# Patient Record
Sex: FEMALE | Race: WHITE | NOT HISPANIC OR LATINO | Employment: UNEMPLOYED | URBAN - METROPOLITAN AREA
[De-identification: names, ages, dates, MRNs, and addresses within clinical notes are randomized per-mention and may not be internally consistent; named-entity substitution may affect disease eponyms.]

---

## 2020-12-17 RX ORDER — LEVOTHYROXINE AND LIOTHYRONINE 38; 9 UG/1; UG/1
TABLET ORAL
Qty: 90 TABLET | Refills: 0 | Status: SHIPPED | OUTPATIENT
Start: 2020-12-17 | End: 2023-02-09 | Stop reason: SDUPTHER

## 2020-12-17 NOTE — TELEPHONE ENCOUNTER
Patient called, she needs a refill for her medication. However, she was taking ThyroidNP first, switched to armor, and wants to be switched back to ThyroidNP if possible.

## 2021-03-22 RX ORDER — LEVOTHYROXINE AND LIOTHYRONINE 38; 9 UG/1; UG/1
TABLET ORAL
Qty: 90 TABLET | Refills: 0 | OUTPATIENT
Start: 2021-03-22

## 2023-02-08 ENCOUNTER — OFFICE VISIT (OUTPATIENT)
Dept: ENDOCRINOLOGY | Facility: CLINIC | Age: 33
End: 2023-02-08

## 2023-02-08 VITALS
HEIGHT: 61 IN | BODY MASS INDEX: 29.45 KG/M2 | HEART RATE: 72 BPM | SYSTOLIC BLOOD PRESSURE: 92 MMHG | DIASTOLIC BLOOD PRESSURE: 50 MMHG | WEIGHT: 156 LBS | OXYGEN SATURATION: 99 %

## 2023-02-08 DIAGNOSIS — E55.9 VITAMIN D DEFICIENCY: ICD-10-CM

## 2023-02-08 DIAGNOSIS — E89.0 POSTOPERATIVE HYPOTHYROIDISM: Primary | ICD-10-CM

## 2023-02-08 DIAGNOSIS — R06.09 DYSPNEA ON EXERTION: ICD-10-CM

## 2023-02-08 PROCEDURE — 82306 VITAMIN D 25 HYDROXY: CPT | Performed by: INTERNAL MEDICINE

## 2023-02-08 PROCEDURE — 99214 OFFICE O/P EST MOD 30 MIN: CPT | Performed by: INTERNAL MEDICINE

## 2023-02-08 PROCEDURE — 84443 ASSAY THYROID STIM HORMONE: CPT | Performed by: INTERNAL MEDICINE

## 2023-02-08 PROCEDURE — 80053 COMPREHEN METABOLIC PANEL: CPT | Performed by: INTERNAL MEDICINE

## 2023-02-08 RX ORDER — MELATONIN
1000 DAILY
COMMUNITY

## 2023-02-08 RX ORDER — CETIRIZINE HYDROCHLORIDE 5 MG/1
5 TABLET ORAL DAILY
COMMUNITY

## 2023-02-08 RX ORDER — MAGNESIUM GLUCONATE 27 MG(500)
27 TABLET ORAL 2 TIMES DAILY
COMMUNITY

## 2023-02-08 NOTE — PROGRESS NOTES
"     Office Note      Date: 2023  Patient Name: Agueda Batres  MRN: [id number]  : 1990    Chief Complaint   Patient presents with   • Thyroid Problem   • Hashimoto's Thyroiditis       History of Present Illness:   Agueda Batres is a 32 y.o. female who presents for Thyroid Problem and Hashimoto's Thyroiditis  .  I last saw on 2020 so it has been almost 3 years.(she had moved to [place] and is now back in [place])   she has hypothyroidism following left hemithyroidectomy  And a cyst in the inferior right lobe     Current rx: no thyroid 60 mg per day     Changes in history: none   Questions /problems: she notes lightheadedness, dizziness, joint point,     Subjective          Review of Systems:   Review of Systems   Constitutional: Positive for fatigue. Negative for unexpected weight change.   HENT: Positive for voice change. Negative for trouble swallowing.    Eyes: Negative for visual disturbance.   Respiratory: Positive for choking.    Cardiovascular: Positive for palpitations.   Endocrine: Positive for heat intolerance.   Musculoskeletal: Positive for arthralgias.   Neurological: Positive for dizziness, tremors and light-headedness.   Psychiatric/Behavioral: The patient is nervous/anxious.        The following portions of the patient's history were reviewed and updated as appropriate: allergies, current medications, past family history, past medical history, past social history, past surgical history and problem list.    Objective     Visit Vitals  BP 92/50 (BP Location: Left arm, Patient Position: Sitting, Cuff Size: Adult)   Pulse 72   Ht 154.9 cm (61\")   Wt 70.8 kg (156 lb)   SpO2 99%   BMI 29.48 kg/m²           Physical Exam:  Physical Exam  Vitals reviewed.   Constitutional:       Appearance: Normal appearance.   HENT:      Head: Normocephalic and atraumatic.   Eyes:      Extraocular Movements: Extraocular movements intact.   Neck:      Comments: Left lobe absent . Right feels normal  Cardiovascular: "      Rate and Rhythm: Normal rate.   Pulmonary:      Effort: Pulmonary effort is normal. No respiratory distress.      Breath sounds: No stridor. No wheezing, rhonchi or rales.   Lymphadenopathy:      Cervical: No cervical adenopathy.   Neurological:      Mental Status: She is alert.   Psychiatric:         Mood and Affect: Mood normal.         Behavior: Behavior normal.         Thought Content: Thought content normal.         Judgment: Judgment normal.         Assessment / Plan      Assessment & Plan:  Problem List Items Addressed This Visit        Other    Postoperative hypothyroidism - Primary    Current Assessment & Plan     Stable  Clinically euthyroid. Thyroid levels ordered. Medication to be adjusted accordingly.         Relevant Medications    Thyroid (NP THYROID) 60 MG PO tablet    Other Relevant Orders    TSH    Dyspnea on exertion    Current Assessment & Plan     Lungs are clear and heart sounds good will check cbc         Relevant Orders    CBC (No Diff)    Vitamin D deficiency    Relevant Orders    Comprehensive Metabolic Panel    Vitamin D,25-Hydroxy        Winston Weiss MD   02/08/2023

## 2023-02-09 LAB
25(OH)D3 SERPL-MCNC: 40.1 NG/ML (ref 30–100)
ALBUMIN SERPL-MCNC: 4.8 G/DL (ref 3.5–5.2)
ALBUMIN/GLOB SERPL: 1.9 G/DL
ALP SERPL-CCNC: 67 U/L (ref 39–117)
ALT SERPL W P-5'-P-CCNC: 21 U/L (ref 1–33)
ANION GAP SERPL CALCULATED.3IONS-SCNC: 10.3 MMOL/L (ref 5–15)
AST SERPL-CCNC: 19 U/L (ref 1–32)
BILIRUB SERPL-MCNC: 0.7 MG/DL (ref 0–1.2)
BUN SERPL-MCNC: 10 MG/DL (ref 6–20)
BUN/CREAT SERPL: 13.5 (ref 7–25)
CALCIUM SPEC-SCNC: 10 MG/DL (ref 8.6–10.5)
CHLORIDE SERPL-SCNC: 104 MMOL/L (ref 98–107)
CO2 SERPL-SCNC: 25.7 MMOL/L (ref 22–29)
CREAT SERPL-MCNC: 0.74 MG/DL (ref 0.57–1)
EGFRCR SERPLBLD CKD-EPI 2021: 111.1 ML/MIN/1.73
GLOBULIN UR ELPH-MCNC: 2.5 GM/DL
GLUCOSE SERPL-MCNC: 96 MG/DL (ref 65–99)
POTASSIUM SERPL-SCNC: 4.4 MMOL/L (ref 3.5–5.2)
PROT SERPL-MCNC: 7.3 G/DL (ref 6–8.5)
SODIUM SERPL-SCNC: 140 MMOL/L (ref 136–145)
TSH SERPL DL<=0.05 MIU/L-ACNC: 0.52 UIU/ML (ref 0.27–4.2)

## 2023-02-09 RX ORDER — LEVOTHYROXINE AND LIOTHYRONINE 38; 9 UG/1; UG/1
TABLET ORAL
Qty: 90 TABLET | Refills: 3 | Status: SHIPPED | OUTPATIENT
Start: 2023-02-09

## 2024-02-06 RX ORDER — THYROID 60 MG/1
TABLET ORAL
Qty: 90 TABLET | Refills: 3 | Status: SHIPPED | OUTPATIENT
Start: 2024-02-06

## 2024-02-06 NOTE — TELEPHONE ENCOUNTER
Rx Refill Note  Requested Prescriptions     Pending Prescriptions Disp Refills    Thyroid (ARMOUR THYROID) 60 MG PO tablet [Pharmacy Med Name: ARMOUR THYROID 60 MG TABLET] 90 tablet 3     Sig: TAKE 1 TABLET BY MOUTH DAILY          Last office visit with prescribing clinician: 2/8/2023     Next office visit with prescribing clinician: 2/9/2024         Sarah Encarnacion MA  02/06/24, 09:17 EST

## 2024-02-09 ENCOUNTER — OFFICE VISIT (OUTPATIENT)
Dept: ENDOCRINOLOGY | Facility: CLINIC | Age: 34
End: 2024-02-09
Payer: COMMERCIAL

## 2024-02-09 VITALS
HEART RATE: 76 BPM | DIASTOLIC BLOOD PRESSURE: 58 MMHG | BODY MASS INDEX: 30.4 KG/M2 | OXYGEN SATURATION: 99 % | SYSTOLIC BLOOD PRESSURE: 90 MMHG | HEIGHT: 61 IN | WEIGHT: 161 LBS

## 2024-02-09 DIAGNOSIS — E89.0 POSTOPERATIVE HYPOTHYROIDISM: Primary | ICD-10-CM

## 2024-02-09 PROCEDURE — 84443 ASSAY THYROID STIM HORMONE: CPT | Performed by: INTERNAL MEDICINE

## 2024-02-09 PROCEDURE — 86376 MICROSOMAL ANTIBODY EACH: CPT | Performed by: INTERNAL MEDICINE

## 2024-02-09 NOTE — PROGRESS NOTES
"     Office Note      Date: 2024  Patient Name: Agueda Batres  MRN: [id number]  : 1990    Chief Complaint   Patient presents with    Hypothyroidism       History of Present Illness:   Agueda Batres is a 33 y.o. female who presents for Hypothyroidism  .   Current rx: armour 60 mg per day     Changes in history:none   Questions /problems:continues to be bothered by intermittent joint pain . Fatigue persists     Subjective          Review of Systems:   Review of Systems   Constitutional:  Positive for fatigue.   Musculoskeletal:  Positive for arthralgias.       The following portions of the patient's history were reviewed and updated as appropriate: allergies, current medications, past family history, past medical history, past social history, past surgical history, and problem list.    Objective     Visit Vitals  BP 90/58 (BP Location: Left arm, Patient Position: Sitting, Cuff Size: Adult)   Pulse 76   Ht 154.9 cm (61\")   Wt 73 kg (161 lb)   SpO2 99%   BMI 30.42 kg/m²           Physical Exam:  Physical Exam  Vitals reviewed.   Constitutional:       Appearance: Normal appearance.   Neck:      Comments: Indurated thyoid on left   Lymphadenopathy:      Cervical: No cervical adenopathy.   Neurological:      Mental Status: She is alert.   Psychiatric:         Mood and Affect: Mood normal.         Behavior: Behavior normal.         Thought Content: Thought content normal.         Judgment: Judgment normal.         Assessment / Plan      Assessment & Plan:  Problem List Items Addressed This Visit          Other    Postoperative hypothyroidism - Primary    Current Assessment & Plan     Clinically euthyroid. Thyroid levels ordered. Medication to be adjusted accordingly.          Relevant Medications    Thyroid (ARMOUR THYROID) 60 MG PO tablet    Other Relevant Orders    TSH    Thyroid Peroxidase Antibody        Electronically signed by : Winston Weiss MD   2024    "

## 2024-02-10 LAB
THYROPEROXIDASE AB SERPL-ACNC: 98 IU/ML (ref 0–34)
TSH SERPL DL<=0.05 MIU/L-ACNC: 0.81 UIU/ML (ref 0.27–4.2)

## 2024-10-02 ENCOUNTER — OFFICE VISIT (OUTPATIENT)
Age: 34
End: 2024-10-02
Payer: COMMERCIAL

## 2024-10-02 VITALS
WEIGHT: 160 LBS | SYSTOLIC BLOOD PRESSURE: 110 MMHG | HEIGHT: 61 IN | TEMPERATURE: 97.2 F | BODY MASS INDEX: 30.21 KG/M2 | DIASTOLIC BLOOD PRESSURE: 74 MMHG | HEART RATE: 76 BPM

## 2024-10-02 DIAGNOSIS — R76.8 POSITIVE ANA (ANTINUCLEAR ANTIBODY): ICD-10-CM

## 2024-10-02 DIAGNOSIS — E89.0 POSTOPERATIVE HYPOTHYROIDISM: ICD-10-CM

## 2024-10-02 DIAGNOSIS — R53.82 CHRONIC FATIGUE: ICD-10-CM

## 2024-10-02 DIAGNOSIS — M05.79 RHEUMATOID ARTHRITIS INVOLVING MULTIPLE SITES WITH POSITIVE RHEUMATOID FACTOR: Primary | ICD-10-CM

## 2024-10-02 RX ORDER — PREDNISONE 5 MG/1
TABLET ORAL
COMMUNITY
Start: 2024-09-27

## 2024-10-02 RX ORDER — FEXOFENADINE HCL 60 MG/1
TABLET, FILM COATED ORAL
COMMUNITY
Start: 2024-09-28

## 2024-10-02 NOTE — PROGRESS NOTES
"                                        Office Visit       Date: 10/02/2024   Patient Name: Agueda Batres  MRN: [id number]  YOB: 1990    Referring Physician: Rodrigo Casiano MD     Chief Complaint: Rheumatoid arthritis      History of Present Illness: Agueda Batres is a 33 y.o. female who is here today in consultation for rheumatoid arthritis.  In late 2021, she had COVID.  About 3 weeks later, she began having joint pain.  It began in her left ankle and then she had pain in her shoulders, then her elbows, then her hands, and then her feet.  The symptoms would come and go.  They could last a few days or a month or so.  Eventually, she had persistent pain and swelling in the MCPs of both hands.  She was living in [place] at that time and saw a local physician who obtained labs.  She was told she had a positive rheumatoid arthritis test and they suggested referral to a rheumatologist but she was in the midst of moving.    She states after that, she \"ignored\" her pain.  She continued to have episodes of joint swelling and joint pain with persistent swelling in both hands.  She had a \"frozen shoulder\".  This resolved with time.    She eventually saw her primary care provider.  On 9/26/2024, labs included normal CMP, positive rheumatoid factor 45.1, positive CCP antibody greater than 250, normal sedimentation rate and CRP, and positive direct antinuclear antibody.  X-ray of the hands revealed an erosion and joint space narrowing in the left second MCP.    She has been having about 3 hours of morning stiffness.  Pain does not keep her awake at night.  Her MCPs are her worst areas.  Her activities of daily living remain fairly good.  She has no ocular symptoms.  She has had no lung symptoms.  She has had no lesions to suggest vasculitis.    She was given prednisone but she decided she did not wish to start that.  She has used Tylenol with little relief.  She has been on some supplements and changed her diet " and she thinks that this may have helped some.      Subjective   Review of Systems: Review of Systems   Constitutional:  Positive for fatigue. Negative for chills, fever and unexpected weight loss.   HENT:  Negative for dental problem, mouth sores, sinus pressure and swollen glands.         Hoarseness  Nasal drainage  Nasal sores     Eyes:  Negative for photophobia, pain and redness.   Respiratory:  Negative for apnea, cough, chest tightness and shortness of breath.    Cardiovascular:  Negative for chest pain and leg swelling.   Gastrointestinal:  Negative for abdominal pain, diarrhea, nausea and GERD.   Endocrine:        Hot flashes   Genitourinary:  Negative for dysuria and hematuria.   Musculoskeletal:  Positive for arthralgias, joint swelling, neck pain and neck stiffness.        Morning stiffness  Joint tenderness     Skin:  Negative for dry skin, rash, skin lesions and bruise.   Allergic/Immunologic: Positive for environmental allergies and food allergies.   Neurological:  Positive for headache. Negative for dizziness, seizures, syncope, weakness and memory problem.   Hematological:  Negative for adenopathy. Does not bruise/bleed easily.   Psychiatric/Behavioral:  Negative for sleep disturbance, suicidal ideas, depressed mood and stress. The patient is nervous/anxious.         Past Medical History:   Past Medical History:   Diagnosis Date    Arthritis     Hashimoto's thyroiditis     Hypothyroidism     Stomach ulcer        Past Surgical History:   Past Surgical History:   Procedure Laterality Date    THYROID SURGERY      THYROIDECTOMY      partial    TONSILLECTOMY N/A     WISDOM TOOTH EXTRACTION N/A        Family History:   Family History   Problem Relation Age of Onset    Hyperlipidemia Mother     No Known Problems Father     Nephrolithiasis Brother        Social History:   Social History     Socioeconomic History    Marital status:    Tobacco Use    Smoking status: Never    Smokeless tobacco: Never  "  Vaping Use    Vaping status: Never Used   Substance and Sexual Activity    Alcohol use: Yes     Comment: social    Drug use: Never    Sexual activity: Defer       Medications:   Current Outpatient Medications:     fexofenadine (Allegra Allergy) 60 MG tablet, , Disp: , Rfl:     magnesium gluconate (MAGONATE) 500 MG tablet, Take 1 tablet by mouth 2 (Two) Times a Day., Disp: , Rfl:     Thyroid (ARMOUR THYROID) 60 MG PO tablet, TAKE 1 TABLET BY MOUTH DAILY, Disp: 90 tablet, Rfl: 3    cetirizine (zyrTEC) 5 MG tablet, Take 1 tablet by mouth Daily., Disp: , Rfl:     cholecalciferol (VITAMIN D3) 25 MCG (1000 UT) tablet, Take 1 tablet by mouth Daily., Disp: , Rfl:     predniSONE (DELTASONE) 5 MG tablet, , Disp: , Rfl:     Allergies:   Allergies   Allergen Reactions    Eggs-Apples-Oats [Alitraq] Anaphylaxis     Egg    Nsaids Itching     Hands and face itch severly       I have reviewed and updated the patient's chief complaint, history of present illness, review of systems, past medical history, surgical history, family history, social history, medications and allergy list as appropriate.     Objective    Vital Signs:   Vitals:    10/02/24 1449   BP: 110/74   BP Location: Left arm   Patient Position: Sitting   Cuff Size: Adult   Pulse: 76   Temp: 97.2 °F (36.2 °C)   Weight: 72.6 kg (160 lb)   Height: 154.9 cm (61\")   PainSc:   7   PainLoc: Hand  Comment: shoulder, feet     Body mass index is 30.23 kg/m².     Physical Exam:  General: The patient is well-developed and well nourished. Cooperative, alert and oriented x3. Affect is normal. Hydration appears normal.   HEENT: Normocephalic and atraumatic. No notable alopecia. Lids and conjunctiva are normal. Pupils are equal and sclera are clear. Oropharynx is clear   NECK: Supple without adenopathy, masses or thyromegaly.  There is a well-healed scar at the base of the neck.  CARDIOVASCULAR: Regular rate and rhythm. No murmurs, rubs or gallops   LUNGS: Effort is normal. Lungs are " clear bilaterally.  ABDOMEN: Soft and non-tender without masses or hepatosplenomegaly..   EXTREMITIES: No edema.  No cyanosis or clubbing.  SKIN: Inspection and palpation are normal.  I see no rashes, nodules, psoriatic lesions, or nail pits.  NEUROLOGIC: Gait is normal.  Deep tendon reflexes are 2+ and symmetric.  MUSCULOSKELETAL: Complete joint exam was performed. There was full range of motion of the shoulders, elbows, wrists and hands without soft tissue swelling synovitis or deformities except as noted.  Hips have good flexion and internal and external rotation.  Knees have no palpable effusions.  There is full extension and flexion.  Ankles have no soft tissue swelling or synovitis.  BACK:  Straight without scoliosis  Joint Exam 10/02/2024        Right  Left   MCP 1  Swollen Tender  Swollen Tender   MCP 2  Swollen Tender  Swollen Tender   MCP 3  Swollen Tender      MTP 2     Swollen      The following joints were examined and normal:   Left TMJ, Right TMJ, Left Sternoclavicular, Right Sternoclavicular, Left Acromioclavicular, Right Acromioclavicular, Left Glenohumeral, Right Glenohumeral, Left Elbow, Right Elbow, Left Wrist, Right Wrist, Left MCP 3, Left MCP 4, Right MCP 4, Left MCP 5, Right MCP 5, Left IP (thumb), Right IP (thumb), Left PIP 2 (finger), Right PIP 2 (finger), Left PIP 3 (finger), Right PIP 3 (finger), Left PIP 4 (finger), Right PIP 4 (finger), Left PIP 5 (finger), Right PIP 5 (finger), Left DIP 2 (finger), Right DIP 2 (finger), Left DIP 3 (finger), Right DIP 3 (finger), Left DIP 4 (finger), Right DIP 4 (finger), Left DIP 5 (finger), Right DIP 5 (finger), Left Hip, Right Hip, Left Knee, Right Knee, Left Ankle, Right Ankle, Left Tarsometatarsal, Right Tarsometatarsal, Left MTP 1, Right MTP 1, Right MTP 2, Left MTP 3, Right MTP 3, Left MTP 4, Right MTP 4, Left MTP 5, Right MTP 5, Left IP (toe), Right IP (toe), Left PIP 2 (toe), Right PIP 2 (toe), Left PIP 3 (toe), Right PIP 3 (toe), Left PIP 4  (toe), Right PIP 4 (toe), Left PIP 5 (toe), Right PIP 5 (toe)       Patient Global: --  Provider Global: --      Assessment / Plan        Assessment & Plan  Rheumatoid arthritis involving multiple sites with positive rheumatoid factor  Onset January 2022 with pain and swelling.  History of positive rheumatoid factor in 2022.  Saw PCP 9/24.  Rheumatoid factor 45.1 and CCP greater than 250.  Direct EMERITA positive.  X-rays of the hands revealed joint space narrowing and erosion left second MCP.    She has seropositive rheumatoid arthritis.  She will need treatment.  We discussed treatment options including methotrexate, leflunomide, and Biologics.  She is planning to get pregnant in the near future so we will need to avoid methotrexate and leflunomide.  I suggested we try Cimzia as it does not cross the placental barrier.  She would like to discuss this with her  prior to making a decision.  I will obtain several labs today while we wait for her decision.  We discussed biologic agents at length. Risks and alternative were discussed at length and the option of no treatment was also given. We discussed risks including but not limited to infections which can be unusual,  severe, and deadly. When possible, these agents should be stopped immediately if infections occur. Unusual infection such as TB and fungal infections can occur. There may be an increased risk of lymphoma with these agents. Other risks can include are multiple sclerosis like illness and worsening of the failure. Infusion or injection reactions whish can be delay have been reported.  Reactivation of daily brain viruses have been reported.  Worsening of COPD has been seen with Orencia.  Elevated lipids, elevations in liver functions, and dangerous changes in blood counts have been seen with certain agents.  Regular monitoring will be required.  I will see her in 2 months.  If she decides she wants treated sooner, we will work on approving Cimzia.  I  suggested she get treatment sooner rather than later due to the risk of damage to her joints and subsequent destruction of joints which can lead to deformities.  Chronic fatigue  Energy has been poor since her joints have been painful.  Postoperative hypothyroidism  On replacement  Positive EMERITA (antinuclear antibody)  By direct method.  Obtain IFA EMERITA and MANASA panel.    Orders Placed This Encounter   Procedures    XR Foot 3+ View Bilateral    XR Chest 2 View    CBC Auto Differential    Hepatitis Panel, Acute    QuantiFERON TB Plus Client Incubated    EMERITA by IFA, Reflex 9-biomarkers profile    C4+C3    Urinalysis With Culture If Indicated -    Sedimentation Rate    C-reactive Protein    dsDNA Antibody by IFA, Aviva loza, with Reflex to Titer             Follow Up:   Return in about 2 months (around 12/2/2024).        Eliud Jones MD  [facility]

## 2024-10-02 NOTE — ASSESSMENT & PLAN NOTE
Onset January 2022 with pain and swelling.  History of positive rheumatoid factor in 2022.  Saw PCP 9/24.  Rheumatoid factor 45.1 and CCP greater than 250.  Direct EMERITA positive.  X-rays of the hands revealed joint space narrowing and erosion left second MCP.    She has seropositive rheumatoid arthritis.  She will need treatment.  We discussed treatment options including methotrexate, leflunomide, and Biologics.  She is planning to get pregnant in the near future so we will need to avoid methotrexate and leflunomide.  I suggested we try Cimzia as it does not cross the placental barrier.  She would like to discuss this with her  prior to making a decision.  I will obtain several labs today while we wait for her decision.  We discussed biologic agents at length. Risks and alternative were discussed at length and the option of no treatment was also given. We discussed risks including but not limited to infections which can be unusual,  severe, and deadly. When possible, these agents should be stopped immediately if infections occur. Unusual infection such as TB and fungal infections can occur. There may be an increased risk of lymphoma with these agents. Other risks can include are multiple sclerosis like illness and worsening of the failure. Infusion or injection reactions whish can be delay have been reported.  Reactivation of daily brain viruses have been reported.  Worsening of COPD has been seen with Orencia.  Elevated lipids, elevations in liver functions, and dangerous changes in blood counts have been seen with certain agents.  Regular monitoring will be required.  I will see her in 2 months.  If she decides she wants treated sooner, we will work on approving Cimzia.  I suggested she get treatment sooner rather than later due to the risk of damage to her joints and subsequent destruction of joints which can lead to deformities.

## 2025-01-16 ENCOUNTER — OFFICE VISIT (OUTPATIENT)
Age: 35
End: 2025-01-16
Payer: COMMERCIAL

## 2025-01-16 VITALS
DIASTOLIC BLOOD PRESSURE: 62 MMHG | HEART RATE: 62 BPM | BODY MASS INDEX: 29.64 KG/M2 | SYSTOLIC BLOOD PRESSURE: 112 MMHG | OXYGEN SATURATION: 100 % | WEIGHT: 157 LBS | HEIGHT: 61 IN

## 2025-01-16 DIAGNOSIS — E89.0 POSTOPERATIVE HYPOTHYROIDISM: Primary | ICD-10-CM

## 2025-01-16 LAB
T3FREE SERPL-MCNC: 3.44 PG/ML (ref 2–4.4)
T4 FREE SERPL-MCNC: 0.93 NG/DL (ref 0.92–1.68)
TSH SERPL DL<=0.05 MIU/L-ACNC: 0.7 UIU/ML (ref 0.27–4.2)

## 2025-01-16 PROCEDURE — 84481 FREE ASSAY (FT-3): CPT | Performed by: INTERNAL MEDICINE

## 2025-01-16 PROCEDURE — 84443 ASSAY THYROID STIM HORMONE: CPT | Performed by: INTERNAL MEDICINE

## 2025-01-16 PROCEDURE — 86376 MICROSOMAL ANTIBODY EACH: CPT | Performed by: INTERNAL MEDICINE

## 2025-01-16 PROCEDURE — 84439 ASSAY OF FREE THYROXINE: CPT | Performed by: INTERNAL MEDICINE

## 2025-01-16 RX ORDER — MULTIPLE VITAMINS W/ MINERALS TAB 9MG-400MCG
1 TAB ORAL DAILY
COMMUNITY

## 2025-01-17 RX ORDER — THYROID 60 MG/1
TABLET ORAL
Qty: 90 TABLET | Refills: 3 | Status: SHIPPED | OUTPATIENT
Start: 2025-01-17

## 2025-01-18 LAB — THYROPEROXIDASE AB SERPL-ACNC: 21 IU/ML (ref 0–34)

## 2025-01-31 RX ORDER — THYROID 60 MG/1
TABLET ORAL
Qty: 90 TABLET | Refills: 3 | OUTPATIENT
Start: 2025-01-31

## 2025-05-06 ENCOUNTER — TELEPHONE (OUTPATIENT)
Dept: ENDOCRINOLOGY | Facility: CLINIC | Age: 35
End: 2025-05-06
Payer: COMMERCIAL

## 2025-05-06 NOTE — TELEPHONE ENCOUNTER
Pt called stated she had allergy test done last Wednesday she found out she us allergic to pork pt has been taking armour thyroid for about 6 years pt has rheumatoid arthritis joint pain  and digestive issues. Pt did know if [place] Thyroid has any effects with allergy testing.

## 2025-05-07 RX ORDER — LEVOTHYROXINE SODIUM 50 UG/1
50 TABLET ORAL DAILY
Qty: 90 TABLET | Refills: 3 | Status: SHIPPED | OUTPATIENT
Start: 2025-05-07 | End: 2026-05-07

## 2025-05-07 RX ORDER — LIOTHYRONINE SODIUM 5 UG/1
5 TABLET ORAL DAILY
Qty: 90 TABLET | Refills: 3 | Status: SHIPPED | OUTPATIENT
Start: 2025-05-07 | End: 2026-05-07

## 2025-05-07 NOTE — TELEPHONE ENCOUNTER
Other than being made for pork thyroid no, It won't interfere with allergy testing.  She ought to switch to levothyroxine coupled with cytomel

## 2025-05-07 NOTE — TELEPHONE ENCOUNTER
Spoke to patient and she is ok with switching to levothyroxine and cytomel, she would like that sent to Pharmacy listed in chart.  She also stated that she is going to start taking cemzia for arthritis and wanted to make sure there would not be any interactions.

## 2025-08-04 DIAGNOSIS — E89.0 POSTOPERATIVE HYPOTHYROIDISM: Primary | ICD-10-CM

## 2025-08-04 RX ORDER — LEVOTHYROXINE SODIUM 75 UG/1
75 TABLET ORAL DAILY
Qty: 30 TABLET | Refills: 11 | Status: SHIPPED | OUTPATIENT
Start: 2025-08-04 | End: 2026-08-04